# Patient Record
Sex: FEMALE | Race: BLACK OR AFRICAN AMERICAN | Employment: OTHER | URBAN - METROPOLITAN AREA
[De-identification: names, ages, dates, MRNs, and addresses within clinical notes are randomized per-mention and may not be internally consistent; named-entity substitution may affect disease eponyms.]

---

## 2016-09-26 LAB — HBA1C MFR BLD HPLC: 13.5 %

## 2017-08-27 DIAGNOSIS — E78.00 PURE HYPERCHOLESTEROLEMIA: ICD-10-CM

## 2017-08-28 RX ORDER — ATORVASTATIN CALCIUM 20 MG/1
TABLET, FILM COATED ORAL
Qty: 30 TAB | Refills: 0 | Status: SHIPPED | OUTPATIENT
Start: 2017-08-28 | End: 2017-09-19 | Stop reason: SDUPTHER

## 2017-09-19 ENCOUNTER — OFFICE VISIT (OUTPATIENT)
Dept: FAMILY MEDICINE CLINIC | Age: 62
End: 2017-09-19

## 2017-09-19 VITALS
DIASTOLIC BLOOD PRESSURE: 89 MMHG | WEIGHT: 185.4 LBS | SYSTOLIC BLOOD PRESSURE: 144 MMHG | TEMPERATURE: 97.3 F | HEART RATE: 87 BPM | BODY MASS INDEX: 32.85 KG/M2 | OXYGEN SATURATION: 95 % | RESPIRATION RATE: 16 BRPM | HEIGHT: 63 IN

## 2017-09-19 DIAGNOSIS — Z23 ENCOUNTER FOR IMMUNIZATION: ICD-10-CM

## 2017-09-19 DIAGNOSIS — L01.00 IMPETIGO: ICD-10-CM

## 2017-09-19 DIAGNOSIS — I10 ESSENTIAL HYPERTENSION: Primary | ICD-10-CM

## 2017-09-19 DIAGNOSIS — E78.00 PURE HYPERCHOLESTEROLEMIA: ICD-10-CM

## 2017-09-19 DIAGNOSIS — E11.9 CONTROLLED TYPE 2 DIABETES MELLITUS WITHOUT COMPLICATION, WITHOUT LONG-TERM CURRENT USE OF INSULIN (HCC): ICD-10-CM

## 2017-09-19 DIAGNOSIS — E55.9 VITAMIN D DEFICIENCY: ICD-10-CM

## 2017-09-19 DIAGNOSIS — Z12.11 SCREENING FOR COLON CANCER: ICD-10-CM

## 2017-09-19 LAB — HBA1C MFR BLD HPLC: 6.6 %

## 2017-09-19 RX ORDER — ATORVASTATIN CALCIUM 20 MG/1
TABLET, FILM COATED ORAL
Qty: 90 TAB | Refills: 1 | Status: SHIPPED | OUTPATIENT
Start: 2017-09-19

## 2017-09-19 RX ORDER — GLIMEPIRIDE 4 MG/1
TABLET ORAL
Refills: 3 | COMMUNITY
Start: 2017-07-24

## 2017-09-19 RX ORDER — EMPAGLIFLOZIN 25 MG/1
TABLET, FILM COATED ORAL
Refills: 3 | COMMUNITY
Start: 2017-08-04

## 2017-09-19 RX ORDER — DULAGLUTIDE 1.5 MG/.5ML
INJECTION, SOLUTION SUBCUTANEOUS
Refills: 5 | COMMUNITY
Start: 2017-07-24

## 2017-09-19 RX ORDER — LOSARTAN POTASSIUM AND HYDROCHLOROTHIAZIDE 12.5; 5 MG/1; MG/1
TABLET ORAL
Refills: 4 | COMMUNITY
Start: 2017-08-04 | End: 2017-09-19 | Stop reason: SDUPTHER

## 2017-09-19 RX ORDER — LOSARTAN POTASSIUM AND HYDROCHLOROTHIAZIDE 12.5; 5 MG/1; MG/1
TABLET ORAL
Qty: 90 TAB | Refills: 1 | Status: SHIPPED | OUTPATIENT
Start: 2017-09-19

## 2017-09-19 NOTE — PROGRESS NOTES
Chief Complaint   Patient presents with    Medication Refill    Skin Problem     Has area on right wrist and she used Neosporin and it went away but it came back about 1 week ago and is larger. The area itches.  Labs     Fasting     1. Have you been to the ER, urgent care clinic since your last visit? Hospitalized since your last visit? No    2. Have you seen or consulted any other health care providers outside of the [address] [place] [address] since your last visit? Include any pap smears or colon screening. No     I have reviewed Health Maintenance with the patient and updated. Advance Care Planning information reviewed and given to the patient. The patient was counseled on the dangers of tobacco use, and Patient is a non smoker. Reviewed strategies to maximize success, including Continue not to smoke.

## 2017-09-19 NOTE — ASSESSMENT & PLAN NOTE
This condition is managed by Specialist.  Key Antihyperglycemic Medications             TRULICITY 1.5 NV/5.1 mL sub-q pen  (Taking) INJECT 1.5 MG ONCE A WEEK SUBCUTANEOUSLY    JARDIANCE 25 mg tablet  (Taking) TAKE 1 TABLET BY MOUTH EVERY MORNING    glimepiride (AMARYL) 4 mg tablet  (Taking) 2 TAB(S) ONCE A DAY ORALLY    metformin ER (GLUCOPHAGE XR) 500 mg tablet  (Taking) Take 1,000 mg by mouth daily (with dinner).         Other Key Diabetic Medications             losartan-hydroCHLOROthiazide (HYZAAR) 50-12.5 mg per tablet  (Taking) TAKE 1 TABLET BY MOUTH EVERY DAY    atorvastatin (LIPITOR) 20 mg tablet  (Taking) TAKE 1 TABLET EVERY DAY    fenofibrate micronized (LOFIBRA) 134 mg capsule  (Taking) TAKE ONE CAPSULE BY MOUTH DAILY        Lab Results   Component Value Date/Time    Hemoglobin A1c, External 13.5 09/26/2016    Glucose 310 08/17/2016 09:31 AM    Creatinine 0.69 08/17/2016 09:31 AM    Creatinine, External 0.85 06/21/2016    Microalb/Creat ratio (ug/mg creat.) 5.0 08/17/2016 10:40 AM    Urine Microalbumin, External 10 06/21/2016    Cholesterol, total 195 08/17/2016 09:31 AM    HDL Cholesterol 50 08/17/2016 09:31 AM    LDL, calculated 106 08/17/2016 09:31 AM    LDL-C, External 137 06/21/2016    Triglyceride 193 08/17/2016 09:31 AM     Diabetic Foot and Eye Exam HM Status   Topic Date Due    Diabetic Foot Care  12/18/2017 (Originally 12/2/2013)   [facility] Exam  12/18/2017 (Originally 7/18/1965)

## 2017-09-19 NOTE — MR AVS SNAPSHOT
Visit Information Date & Time Provider Department Dept. Phone Encounter #  
 9/19/2017 11:20 AM Vasyl Scott MD [facility] [phone] [id number] Follow-up Instructions Return in about 6 months (around 3/19/2018) for Recheck. Upcoming Health Maintenance Date Due INFLUENZA AGE 9 TO ADULT 8/1/2017 MICROALBUMIN Q1 8/17/2017 LIPID PANEL Q1 8/17/2017 ZOSTER VACCINE AGE 60> 12/18/2017* FOOT EXAM Q1 12/18/2017* BREAST CANCER SCRN MAMMOGRAM 12/18/2017* COLONOSCOPY 12/18/2017* PAP AKA CERVICAL CYTOLOGY 12/18/2017* EYE EXAM RETINAL OR DILATED Q1 12/18/2017* HEMOGLOBIN A1C Q6M 3/19/2018 DTaP/Tdap/Td series (2 - Td) 1/12/2021 *Topic was postponed. The date shown is not the original due date. Allergies as of 9/19/2017  Review Complete On: 9/19/2017 By: Tammy Villa RN Severity Noted Reaction Type Reactions Lisinopril  01/13/2010   Intolerance Cough Pcn [Penicillins]  08/05/2009    Other (comments) From childhood, couldn't walk, limp Current Immunizations  Reviewed on 10/21/2013 Name Date Influenza Vaccine 12/22/2014, 10/14/2013 Influenza Vaccine (Quad) 3/3/2016  4:50 PM  
 Influenza Vaccine Split 10/1/2010 TDAP Vaccine 1/12/2011 ZZZ-RETIRED (DO NOT USE) Pneumococcal Vaccine (Unspecified Type) 8/5/1997 Not reviewed this visit You Were Diagnosed With   
  
 Codes Comments Essential hypertension    -  Primary ICD-10-CM: I10 
ICD-9-CM: 401.9 Pure hypercholesterolemia     ICD-10-CM: E78.00 ICD-9-CM: 272.0 Impetigo     ICD-10-CM: L01.00 ICD-9-CM: 560 Screening for colon cancer     ICD-10-CM: Z12.11 ICD-9-CM: V76.51 Controlled type 2 diabetes mellitus without complication, without long-term current use of insulin ([facility]     ICD-10-CM: E11.9 ICD-9-CM: 250.00 Vitamin D deficiency     ICD-10-CM: E55.9 ICD-9-CM: 268.9 Vitals BP Pulse Temp Resp Height(growth percentile) Weight(growth percentile) 144/89 (BP 1 Location: Left arm, BP Patient Position: Sitting) 87 97.3 °F (36.3 °C) (Oral) 16 5' 3\" (1.6 m) 185 lb 6.4 oz (84.1 kg) SpO2 BMI OB Status Smoking Status 95% 32.84 kg/m2 Postmenopausal Never Smoker Vitals History BMI and BSA Data Body Mass Index Body Surface Area  
 32.84 kg/m 2 1.93 m 2 Preferred Pharmacy Pharmacy Name Phone [id number], [address] [place] [address] [phone] Your Updated Medication List  
  
   
This list is accurate as of: 9/19/17 11:51 AM.  Always use your most recent med list.  
  
  
  
  
 [facility] Generic drug:  Lancets  
  
 atorvastatin 20 mg tablet Commonly known as:  LIPITOR  
TAKE 1 TABLET EVERY DAY  
  
 cholecalciferol (vitamin D3) 2,000 unit Tab Take 4,000 Units by mouth daily. fenofibrate micronized 134 mg capsule Commonly known as:  LOFIBRA TAKE ONE CAPSULE BY MOUTH DAILY  
  
 glimepiride 4 mg tablet Commonly known as:  AMARYL  
2 TAB(S) ONCE A DAY ORALLY  
  
 haloperidol 0.5 mg tablet Commonly known as:  HALDOL  
0.5 mg nightly. JARDIANCE 25 mg tablet Generic drug:  empagliflozin TAKE 1 TABLET BY MOUTH EVERY MORNING  
  
 losartan-hydroCHLOROthiazide 50-12.5 mg per tablet Commonly known as:  HYZAAR  
TAKE 1 TABLET BY MOUTH EVERY DAY  
  
 metFORMIN  mg tablet Commonly known as:  GLUCOPHAGE XR Take 1,000 mg by mouth daily (with dinner). TRULICITY 1.5 [place]/5.4 mL sub-q pen Generic drug:  dulaglutide INJECT 1.5 MG ONCE A WEEK SUBCUTANEOUSLY  
  
 vitamin e 1,000 unit capsule Commonly known as:  E GEMS Take 1,000 Units by mouth daily. Prescriptions Sent to Pharmacy Refills  
 losartan-hydroCHLOROthiazide (HYZAAR) 50-12.5 mg per tablet 1  Sig: TAKE 1 TABLET BY MOUTH EVERY DAY  
 Class: Normal  
 Pharmacy: [facility] #[id number] ALEXIA [address] [place] [address] Ph #: [phone]  
 atorvastatin (LIPITOR) 20 mg tablet 1 Sig: TAKE 1 TABLET EVERY DAY Class: Normal  
 Pharmacy: [facility]/pharmacy #[id number] ALEXIA [address] [place] [address] Ph #: [phone] We Performed the Following AMB POC HEMOGLOBIN A1C [26927 CPT(R)] CBC WITH AUTOMATED DIFF [40015 CPT(R)] LIPID PANEL [80122 CPT(R)] METABOLIC PANEL, COMPREHENSIVE [51243 CPT(R)] MICROALBUMIN, UR, RAND W/ MICROALBUMIN/CREA RATIO [id number] CPT(R)] OCCULT BLOOD, IMMUNOASSAY (FIT) X721325 CPT(R)] TSH 3RD GENERATION [72238 CPT(R)] URINALYSIS W/ RFLX MICROSCOPIC [32160 CPT(R)] VITAMIN D, 25 HYDROXY [id number] CPT(R)] Follow-up Instructions Return in about 6 months (around 3/19/2018) for Recheck. Introducing [facility] & HE[facility]! Jaquan Sood introduces flaregames patient portal. Now you can access parts of your medical record, email your doctor's office, and request medication refills online. 1. In your internet browser, go to [email or web address] 2. Click on the First Time User? Click Here link in the Sign In box. You will see the New Member Sign Up page. 3. Enter your flaregames Access Code exactly as it appears below. You will not need to use this code after youve completed the sign-up process. If you do not sign up before the expiration date, you must request a new code. · flaregames Access Code: [email or web address] Expires: 12/18/2017 11:47 AM 
 
4. Enter the last four digits of your Social Security Number (xxxx) and Date of Birth (mm/dd/yyyy) as indicated and click Submit. You will be taken to the next sign-up page. 5. Create a flaregames ID. This will be your flaregames login ID and cannot be changed, so think of one that is secure and easy to remember. 6. Create a [employer] password. You can change your password at any time. 7. Enter your Password Reset Question and Answer. This can be used at a later time if you forget your password. 8. Enter your e-mail address. You will receive e-mail notification when new information is available in [address] 9. Click Sign Up. You can now view and download portions of your medical record. 10. Click the Download Summary menu link to download a portable copy of your medical information. If you have questions, please visit the Frequently Asked Questions section of the [employer] website. Remember, [employer] is NOT to be used for urgent needs. For medical emergencies, dial 911. Now available from your iPhone and Android! Please provide this summary of care documentation to your next provider. Your primary care clinician is listed as [id number] If you have any questions after today's visit, please call [phone].

## 2017-09-19 NOTE — PROGRESS NOTES
On meds per endo. Had flu shot. Needs annual labs. Sees endo 2 x annually. Sees in Nov.  Place on distal forearm resolved with topical antibiotic but came back. Visit Vitals    /89 (BP 1 Location: Left arm, BP Patient Position: Sitting)    Pulse 87    Temp 97.3 °F (36.3 °C) (Oral)    Resp 16    Ht 5' 3\" (1.6 m)    Wt 185 lb 6.4 oz (84.1 kg)    SpO2 95%    BMI 32.84 kg/m2       Patient alert and cooperative. Reviewed above. Assessment:  1. AODM, much improved control on the new med regimen. 2. Right forearm with what appears to be about a 1 cm diameter area of impetigo. Plan:  1. Advised to restart the topical antibiotic twice a day. 2. Keep covered. 3. If not resolved in a week follow up for further evaluation. 4. Overdue fasting annual labs ordered. 5. Refilled blood pressure and cholesterol medicine. 6. Recheck in six months, otherwise prn.

## 2017-09-19 NOTE — LETTER
10/3/2017 3:46 PM 
 
Ms. Kolby Lewis [address] [facility] [address] [address] Dear Kolby Lewis: 
 
Please find your most recent results below. Resulted Orders AMB POC HEMOGLOBIN A1C Result Value Ref Range Hemoglobin A1c (POC) 6.6 % VITAMIN D, 25 HYDROXY Result Value Ref Range VITAMIN D, 25-HYDROXY 34.1 30.0 - 100.0 ng/mL Comment:  
   Vitamin D deficiency has been defined by the [facility] practice guideline as a 
level of serum 25-OH vitamin D less than 20 ng/mL (1,2). The Endocrine Society went on to further define vitamin D 
insufficiency as a level between 21 and 29 ng/mL (2). 1. IOM ([place] of Medicine). 2010. Dietary reference 
   intakes for calcium and D. [facility]: The 
   [employer]. 2. Lynsey MF, Cielo NC, Sujata LIMON, et al. 
   Evaluation, treatment, and prevention of vitamin D 
   deficiency: an Endocrine Society clinical practice 
   guideline. JCEM. 2011 Jul; 96(7):1911-30. Narrative Performed at:  [address]  [id number] : Nito William MD, Phone:  [phone] MICROALBUMIN, UR, RAND W/ MICROALBUMIN/CREA RATIO Result Value Ref Range Creatinine, urine 63.1 Not Estab. mg/dL Microalbumin, urine <3.0 Not Estab. ug/mL Microalb/Creat ratio (ug/mg creat.) <4.8 0.0 - 30.0 mg/g creat Narrative Performed at:  [address]  [id number] : Nito William MD, Phone:  [phone] CBC WITH AUTOMATED DIFF Result Value Ref Range WBC 6.1 3.4 - 10.8 x10E3/uL  
 RBC 5.22 3.77 - 5.28 x10E6/uL HGB 14.5 11.1 - 15.9 g/dL HCT 43.1 34.0 - 46.6 % MCV 83 79 - 97 fL  
 MCH 27.8 26.6 - 33.0 pg  
 MCHC 33.6 31.5 - 35.7 g/dL  
 RDW 15.0 12.3 - 15.4 % PLATELET 531 437 - 310 x10E3/uL NEUTROPHILS 43 % Lymphocytes 45 % MONOCYTES 10 % EOSINOPHILS 2 % BASOPHILS 0 %  
 ABS. NEUTROPHILS 2.6 1.4 - 7.0 x10E3/uL Abs Lymphocytes 2.7 0.7 - 3.1 x10E3/uL  
 ABS. MONOCYTES 0.6 0.1 - 0.9 x10E3/uL  
 ABS. EOSINOPHILS 0.1 0.0 - 0.4 x10E3/uL  
 ABS. BASOPHILS 0.0 0.0 - 0.2 x10E3/uL IMMATURE GRANULOCYTES 0 %  
 ABS. IMM. GRANS. 0.0 0.0 - 0.1 x10E3/uL Narrative Performed at:  [address]  [id number] : Deyanira Ward MD, Phone:  [phone] URINALYSIS W/ RFLX MICROSCOPIC Result Value Ref Range Specific Gravity 1.029 1.005 - 1.030  
 pH (UA) 5.0 5.0 - 7.5 Color Yellow Yellow Appearance Cloudy (A) Clear Leukocyte Esterase Negative Negative Protein Negative Negative/Trace Glucose 3+ (A) Negative Ketone Negative Negative Blood Negative Negative Bilirubin Negative Negative Urobilinogen 0.2 0.2 - 1.0 mg/dL Nitrites Positive (A) Negative Microscopic Examination See additional order Comment:  
   Microscopic was indicated and was performed. Narrative Performed at:  [address]  [id number] : Deyanira Ward MD, Phone:  [phone] TSH 3RD GENERATION Result Value Ref Range TSH 1.690 0.450 - 4.500 uIU/mL Narrative Performed at:  [address]  [id number] : Deyanira Ward MD, Phone:  [phone] METABOLIC PANEL, COMPREHENSIVE Result Value Ref Range Glucose 116 (H) 65 - 99 mg/dL BUN 15 8 - 27 mg/dL Creatinine 0.62 0.57 - 1.00 mg/dL GFR est non-AA 98 >59 mL/min/1.73 GFR est  >59 mL/min/1.73  
 BUN/Creatinine ratio 24 12 - 28 Sodium 140 134 - 144 mmol/L Potassium 4.1 3.5 - 5.2 mmol/L Chloride 99 96 - 106 mmol/L  
 CO2 21 18 - 29 mmol/L Calcium 10.3 8.7 - 10.3 mg/dL Protein, total 7.9 6.0 - 8.5 g/dL Albumin 4.8 3.6 - 4.8 g/dL GLOBULIN, TOTAL 3.1 1.5 - 4.5 g/dL A-G Ratio 1.5 1.2 - 2.2 Bilirubin, total 0.4 0.0 - 1.2 mg/dL Alk. phosphatase 78 39 - 117 IU/L  
 AST (SGOT) 16 0 - 40 IU/L  
 ALT (SGPT) 17 0 - 32 IU/L Narrative Performed at:  [address]  [id number] : Silver King MD, Phone:  [phone] LIPID PANEL Result Value Ref Range Cholesterol, total 211 (H) 100 - 199 mg/dL Triglyceride 179 (H) 0 - 149 mg/dL HDL Cholesterol 62 >39 mg/dL VLDL, calculated 36 5 - 40 mg/dL LDL, calculated 113 (H) 0 - 99 mg/dL Narrative Performed at:  [address]  [id number] : Silver King MD, Phone:  [phone] MICROSCOPIC EXAMINATION Result Value Ref Range WBC 0-5 0 - 5 /hpf  
 RBC 0-2 0 - 2 /hpf Epithelial cells 0-10 0 - 10 /hpf Casts None seen None seen /lpf Mucus Present Not Estab. Bacteria Few None seen/Few Narrative Performed at:  [address]  [id number] : Silver King MD, Phone:  [phone] CVD REPORT Result Value Ref Range INTERPRETATION Note Comment:  
   Supplement report is available. PDF IMAGE Not applicable Narrative Performed at:  [address] [place] [address]  [id number] : Neftali Chapman PhD, Phone:  [phone] DIABETES PATIENT EDUCATION Result Value Ref Range PDF Image Not applicable Narrative Performed at:  [address] [place] [address]  [id number] : Neftali Chapman PhD, Phone:  [phone] RECOMMENDATIONS: 
Expected glucosuria. Triglycerides have improved.  Continue on atorvastatin. The rest of your labs are OK. Please call me if you have any questions: [phone] Sincerely, Dion Moctezuma MD

## 2017-09-21 LAB
25(OH)D3+25(OH)D2 SERPL-MCNC: 34.1 NG/ML (ref 30–100)
ALBUMIN SERPL-MCNC: 4.8 G/DL (ref 3.6–4.8)
ALBUMIN/CREAT UR: <4.8 MG/G CREAT (ref 0–30)
ALBUMIN/GLOB SERPL: 1.5 {RATIO} (ref 1.2–2.2)
ALP SERPL-CCNC: 78 IU/L (ref 39–117)
ALT SERPL-CCNC: 17 IU/L (ref 0–32)
APPEARANCE UR: ABNORMAL
AST SERPL-CCNC: 16 IU/L (ref 0–40)
BACTERIA #/AREA URNS HPF: NORMAL /[HPF]
BASOPHILS # BLD AUTO: 0 X10E3/UL (ref 0–0.2)
BASOPHILS NFR BLD AUTO: 0 %
BILIRUB SERPL-MCNC: 0.4 MG/DL (ref 0–1.2)
BILIRUB UR QL STRIP: NEGATIVE
BUN SERPL-MCNC: 15 MG/DL (ref 8–27)
BUN/CREAT SERPL: 24 (ref 12–28)
CALCIUM SERPL-MCNC: 10.3 MG/DL (ref 8.7–10.3)
CASTS URNS QL MICRO: NORMAL /LPF
CHLORIDE SERPL-SCNC: 99 MMOL/L (ref 96–106)
CHOLEST SERPL-MCNC: 211 MG/DL (ref 100–199)
CO2 SERPL-SCNC: 21 MMOL/L (ref 18–29)
COLOR UR: YELLOW
CREAT SERPL-MCNC: 0.62 MG/DL (ref 0.57–1)
CREAT UR-MCNC: 63.1 MG/DL
EOSINOPHIL # BLD AUTO: 0.1 X10E3/UL (ref 0–0.4)
EOSINOPHIL NFR BLD AUTO: 2 %
EPI CELLS #/AREA URNS HPF: NORMAL /HPF
ERYTHROCYTE [DISTWIDTH] IN BLOOD BY AUTOMATED COUNT: 15 % (ref 12.3–15.4)
GLOBULIN SER CALC-MCNC: 3.1 G/DL (ref 1.5–4.5)
GLUCOSE SERPL-MCNC: 116 MG/DL (ref 65–99)
GLUCOSE UR QL: ABNORMAL
HCT VFR BLD AUTO: 43.1 % (ref 34–46.6)
HDLC SERPL-MCNC: 62 MG/DL
HGB BLD-MCNC: 14.5 G/DL (ref 11.1–15.9)
HGB UR QL STRIP: NEGATIVE
IMM GRANULOCYTES # BLD: 0 X10E3/UL (ref 0–0.1)
IMM GRANULOCYTES NFR BLD: 0 %
INTERPRETATION, 910389: NORMAL
KETONES UR QL STRIP: NEGATIVE
LDLC SERPL CALC-MCNC: 113 MG/DL (ref 0–99)
LEUKOCYTE ESTERASE UR QL STRIP: NEGATIVE
LYMPHOCYTES # BLD AUTO: 2.7 X10E3/UL (ref 0.7–3.1)
LYMPHOCYTES NFR BLD AUTO: 45 %
Lab: NORMAL
MCH RBC QN AUTO: 27.8 PG (ref 26.6–33)
MCHC RBC AUTO-ENTMCNC: 33.6 G/DL (ref 31.5–35.7)
MCV RBC AUTO: 83 FL (ref 79–97)
MICRO URNS: ABNORMAL
MICROALBUMIN UR-MCNC: <3 UG/ML
MONOCYTES # BLD AUTO: 0.6 X10E3/UL (ref 0.1–0.9)
MONOCYTES NFR BLD AUTO: 10 %
MUCOUS THREADS URNS QL MICRO: PRESENT
NEUTROPHILS # BLD AUTO: 2.6 X10E3/UL (ref 1.4–7)
NEUTROPHILS NFR BLD AUTO: 43 %
NITRITE UR QL STRIP: POSITIVE
PDF IMAGE, 910387: NORMAL
PH UR STRIP: 5 [PH] (ref 5–7.5)
PLATELET # BLD AUTO: 328 X10E3/UL (ref 150–379)
POTASSIUM SERPL-SCNC: 4.1 MMOL/L (ref 3.5–5.2)
PROT SERPL-MCNC: 7.9 G/DL (ref 6–8.5)
PROT UR QL STRIP: NEGATIVE
RBC # BLD AUTO: 5.22 X10E6/UL (ref 3.77–5.28)
RBC #/AREA URNS HPF: NORMAL /HPF
SODIUM SERPL-SCNC: 140 MMOL/L (ref 134–144)
SP GR UR: 1.03 (ref 1–1.03)
TRIGL SERPL-MCNC: 179 MG/DL (ref 0–149)
TSH SERPL DL<=0.005 MIU/L-ACNC: 1.69 UIU/ML (ref 0.45–4.5)
UROBILINOGEN UR STRIP-MCNC: 0.2 MG/DL (ref 0.2–1)
VLDLC SERPL CALC-MCNC: 36 MG/DL (ref 5–40)
WBC # BLD AUTO: 6.1 X10E3/UL (ref 3.4–10.8)
WBC #/AREA URNS HPF: NORMAL /HPF

## 2017-09-25 ENCOUNTER — OFFICE VISIT (OUTPATIENT)
Dept: FAMILY MEDICINE CLINIC | Age: 62
End: 2017-09-25

## 2017-09-25 VITALS
BODY MASS INDEX: 33.49 KG/M2 | TEMPERATURE: 98 F | SYSTOLIC BLOOD PRESSURE: 139 MMHG | HEART RATE: 90 BPM | HEIGHT: 63 IN | RESPIRATION RATE: 18 BRPM | DIASTOLIC BLOOD PRESSURE: 78 MMHG | OXYGEN SATURATION: 95 % | WEIGHT: 189 LBS

## 2017-09-25 DIAGNOSIS — R23.8 SKIN IRRITATION: ICD-10-CM

## 2017-09-25 DIAGNOSIS — L01.00 IMPETIGO: Primary | ICD-10-CM

## 2017-09-25 RX ORDER — MUPIROCIN CALCIUM 20 MG/G
CREAM TOPICAL 2 TIMES DAILY
Qty: 15 G | Refills: 0 | Status: SHIPPED | OUTPATIENT
Start: 2017-09-25 | End: 2017-10-04 | Stop reason: ALTCHOICE

## 2017-09-25 NOTE — MR AVS SNAPSHOT
Visit Information Date & Time Provider Department Dept. Phone Encounter #  
 9/25/2017  5:00 PM Arik Crespo NP [facility] [phone] [id number] Upcoming Health Maintenance Date Due ZOSTER VACCINE AGE 60> 12/18/2017* FOOT EXAM Q1 12/18/2017* BREAST CANCER SCRN MAMMOGRAM 12/18/2017* COLONOSCOPY 12/18/2017* PAP AKA CERVICAL CYTOLOGY 12/18/2017* EYE EXAM RETINAL OR DILATED Q1 12/18/2017* HEMOGLOBIN A1C Q6M 3/19/2018 MICROALBUMIN Q1 9/19/2018 LIPID PANEL Q1 9/19/2018 DTaP/Tdap/Td series (2 - Td) 1/12/2021 *Topic was postponed. The date shown is not the original due date. Allergies as of 9/25/2017  Review Complete On: 9/25/2017 By: Charles Rivera LPN Severity Noted Reaction Type Reactions Lisinopril  01/13/2010   Intolerance Cough Pcn [Penicillins]  08/05/2009    Other (comments) From childhood, couldn't walk, limp Current Immunizations  Reviewed on 9/19/2017 Name Date Influenza Vaccine 12/22/2014, 10/14/2013 Influenza Vaccine (Quad) 3/3/2016  4:50 PM  
 Influenza Vaccine (Quad) PF 9/19/2017 Influenza Vaccine Split 10/1/2010 Pneumococcal Polysaccharide (PPSV-23) 8/5/1997 TDAP Vaccine 1/12/2011 Not reviewed this visit You Were Diagnosed With   
  
 Codes Comments Impetigo    -  Primary ICD-10-CM: L01.00 ICD-9-CM: 962 Skin irritation     ICD-10-CM: R23.8 ICD-9-CM: 709.9 Vitals BP Pulse Temp Resp Height(growth percentile) Weight(growth percentile) 139/78 (BP 1 Location: Left arm, BP Patient Position: Sitting) 90 98 °F (36.7 °C) (Oral) 18 5' 3\" (1.6 m) 189 lb (85.7 kg) SpO2 BMI OB Status Smoking Status 95% 33.48 kg/m2 Postmenopausal Never Smoker Vitals History BMI and BSA Data Body Mass Index Body Surface Area  
 33.48 kg/m 2 1.95 m 2 Preferred Pharmacy Pharmacy Name Phone [facility] #[id number] [phone] Your Updated Medication List  
  
   
This list is accurate as of: 9/25/17  5:45 PM.  Always use your most recent med list.  
  
  
  
  
 [facility] Generic drug:  Lancets  
  
 atorvastatin 20 mg tablet Commonly known as:  LIPITOR  
TAKE 1 TABLET EVERY DAY  
  
 cholecalciferol (vitamin D3) 2,000 unit Tab Take 4,000 Units by mouth daily. fenofibrate micronized 134 mg capsule Commonly known as:  LOFIBRA TAKE ONE CAPSULE BY MOUTH DAILY  
  
 glimepiride 4 mg tablet Commonly known as:  AMARYL  
2 TAB(S) ONCE A DAY ORALLY  
  
 haloperidol 0.5 mg tablet Commonly known as:  HALDOL  
0.5 mg nightly. JARDIANCE 25 mg tablet Generic drug:  empagliflozin TAKE 1 TABLET BY MOUTH EVERY MORNING  
  
 losartan-hydroCHLOROthiazide 50-12.5 mg per tablet Commonly known as:  HYZAAR  
TAKE 1 TABLET BY MOUTH EVERY DAY  
  
 metFORMIN  mg tablet Commonly known as:  GLUCOPHAGE XR Take 1,000 mg by mouth daily (with dinner). mupirocin calcium 2 % topical cream  
Commonly known as:  [facility] [employer] Apply  to affected area two (2) times a day. TRULICITY 1.5 OV/3.3 mL sub-q pen Generic drug:  dulaglutide INJECT 1.5 MG ONCE A WEEK SUBCUTANEOUSLY  
  
 vitamin e 1,000 unit capsule Commonly known as:  E GEMS Take 1,000 Units by mouth daily. Prescriptions Sent to Pharmacy Refills  
 mupirocin calcium (BACTROBAN) 2 % topical cream 0 Sig: Apply  to affected area two (2) times a day. Class: Normal  
 Pharmacy: [facility]/pharmacy #[id number] Ph #: [phone] Route: Topical  
  
Patient Instructions 1) Impetigo - put mupirocin cream on 2x day Impetigo: Care Instructions Your Care Instructions Impetigo (say \"ps-zlj-AZ-go\") is a skin infection caused by bacteria. It causes blisters that break and become oozing, yellow, crusty sores. Impetigo can be anywhere on the body. Scratching the sores may spread the infection to other parts of the body. You can also spread it to others through close contact or when you share towels, clothing, and other items. Prescription antibiotic ointment or pills can usually cure impetigo. (After a day of antibiotics, the infection should not spread.) Follow-up care is a key part of your treatment and safety. Be sure to make and go to all appointments, and call your doctor if you are having problems. It's also a good idea to know your test results and keep a list of the medicines you take. How can you care for yourself at home? · Apply antibiotic ointment exactly as instructed. · If your doctor prescribed antibiotic pills, take them as directed. Do not stop using them just because you feel better. You need to take the full course of antibiotics. · Gently wash the sores with soap and water each day. If crusts form, your doctor may advise you to soften or remove the crusts. You can do this by soaking them in warm water and patting them dry. This can help the cream or ointment treat impetigo. · After you touch the area, wash your hands with soap and water. Or you can use an alcohol-based hand . · Don't share items such as towels, sheets, and clothing until the infection is gone. · Wash anything that may have touched the infected area. · Try to avoid scratching the area. When should you call for help? Call your doctor now or seek immediate medical care if: 
· You have symptoms of a worse infection, such as: 
¨ Increased pain, swelling, warmth, or redness. ¨ Red streaks leading from the area. ¨ Pus draining from the area. ¨ A fever. · Impetigo gets worse or spreads to other areas.  
Watch closely for changes in your health, and be sure to contact your doctor if: 
· You do not get better as expected. Where can you learn more? Go to http://yonas-rasta.info/. Enter U491 in the search box to learn more about \"Impetigo: Care Instructions. \" Current as of: July 26, 2016 Content Version: 11.3 © 6986-5750 [employer]. Care instructions adapted under license by [employer] (which disclaims liability or warranty for this information). If you have questions about a medical condition or this instruction, always ask your healthcare professional. [address] any warranty or liability for your use of this information. Introducing [facility] & HEALTH SERVICES! Madeline Barrow introduces Snapchat patient portal. Now you can access parts of your medical record, email your doctor's office, and request medication refills online. 1. In your internet browser, go to [email or web address] 2. Click on the First Time User? Click Here link in the Sign In box. You will see the New Member Sign Up page. 3. Enter your Snapchat Access Code exactly as it appears below. You will not need to use this code after youve completed the sign-up process. If you do not sign up before the expiration date, you must request a new code. · Snapchat Access Code: [email or web address] Expires: 12/18/2017 11:47 AM 
 
4. Enter the last four digits of your Social Security Number (xxxx) and Date of Birth (mm/dd/yyyy) as indicated and click Submit. You will be taken to the next sign-up page. 5. Create a Qompiumt ID. This will be your Snapchat login ID and cannot be changed, so think of one that is secure and easy to remember. 6. Create a Snapchat password. You can change your password at any time. 7. Enter your Password Reset Question and Answer. This can be used at a later time if you forget your password. 8. Enter your e-mail address. You will receive e-mail notification when new information is available in [address] 9. Click Sign Up. You can now view and download portions of your medical record. 10. Click the Download Summary menu link to download a portable copy of your medical information. If you have questions, please visit the Frequently Asked Questions section of the [employer] website. Remember, [employer] is NOT to be used for urgent needs. For medical emergencies, dial 911. Now available from your iPhone and Android! Please provide this summary of care documentation to your next provider. Your primary care clinician is listed as [id number] If you have any questions after today's visit, please call [phone].

## 2017-09-25 NOTE — PROGRESS NOTES
Jill Warner is a 63 y.o. female  Chief Complaint   Patient presents with    Rash     round reddish rash on right wrist that itches. It developed over a month ago     1. Have you been to the ER, urgent care clinic since your last visit? Hospitalized since your last visit? No    2. Have you seen or consulted any other health care providers outside of the [address] [place] [address] since your last visit? Include any pap smears or colon screening.  No

## 2017-09-25 NOTE — PATIENT INSTRUCTIONS
1) Impetigo - put mupirocin cream on 2x day         Impetigo: Care Instructions  Your Care Instructions  Impetigo (say \"ua-lcf-[id number]\") is a skin infection caused by bacteria. It causes blisters that break and become oozing, yellow, crusty sores. Impetigo can be anywhere on the body. Scratching the sores may spread the infection to other parts of the body. You can also spread it to others through close contact or when you share towels, clothing, and other items. Prescription antibiotic ointment or pills can usually cure impetigo. (After a day of antibiotics, the infection should not spread.)  Follow-up care is a key part of your treatment and safety. Be sure to make and go to all appointments, and call your doctor if you are having problems. It's also a good idea to know your test results and keep a list of the medicines you take. How can you care for yourself at home? · Apply antibiotic ointment exactly as instructed. · If your doctor prescribed antibiotic pills, take them as directed. Do not stop using them just because you feel better. You need to take the full course of antibiotics. · Gently wash the sores with soap and water each day. If crusts form, your doctor may advise you to soften or remove the crusts. You can do this by soaking them in warm water and patting them dry. This can help the cream or ointment treat impetigo. · After you touch the area, wash your hands with soap and water. Or you can use an alcohol-based hand . · Don't share items such as towels, sheets, and clothing until the infection is gone. · Wash anything that may have touched the infected area. · Try to avoid scratching the area. When should you call for help? Call your doctor now or seek immediate medical care if:  · You have symptoms of a worse infection, such as:  ¨ Increased pain, swelling, warmth, or redness. ¨ Red streaks leading from the area. ¨ Pus draining from the area. ¨ A fever.   · Impetigo gets worse or spreads to other areas. Watch closely for changes in your health, and be sure to contact your doctor if:  · You do not get better as expected. Where can you learn more? Go to http://yonas-rasta.info/. Enter P152 in the search box to learn more about \"Impetigo: Care Instructions. \"  Current as of: July 26, 2016  Content Version: 11.3  © 0607-6595 [employer]. Care instructions adapted under license by [employer] (which disclaims liability or warranty for this information). If you have questions about a medical condition or this instruction, always ask your healthcare professional. [address] any warranty or liability for your use of this information.

## 2017-09-25 NOTE — PROGRESS NOTES
S: Keysha Amaya is a 63 y.o. female who presents for skin/rash    Assessment/Plan:    1. Impetigo  -right wrist - 3cm x 2cm area, erythemateous w/small pustules  -mupirocin 2x day to affected area           HPI:  Working in yard, pulling up weeds    Onset: a month ago  Location: right wrist  Was using neosporin on area and covering, but made it itch more  Itching - yes,  Nno pain   Fever/chills - none  Drainage - none but did have some in beginning    Review of Systems:  - Constitutional Symptoms: no fatigue  - Cardiovascular: no chest pain or palpitations  - Respiratory: no cough or shortness of breath  - Musculoskeletal: no joint pains or weakness    I reviewed the following:  Past Medical History:   Diagnosis Date    Advance directive discussed with patient 03/03/2016    Allergic rhinitis     Allergic rhinitis     Ankle pain 03/03/2    PT FIRST note-calcification on XRay    Asthmatic bronchitis     Bronchitis     Cystitis with hematuria 6/6/16    PF [place]    [facility] ([facility]     [id number] blanco 5/5/17 note A1c of 10.1    GERD (gastroesophageal reflux disease)     Hypercholesteremia     Hypertension     Leukoplakia of cervix     Menopause     Psychotic disorder     brief episode of hearing voices past her dad's death    UTI        Current Outpatient Prescriptions   Medication Sig Dispense Refill    TRULICITY 1.5 QL/5.9 mL sub-q pen INJECT 1.5 MG ONCE A WEEK SUBCUTANEOUSLY  5    JARDIANCE 25 mg tablet TAKE 1 TABLET BY MOUTH EVERY MORNING  3    glimepiride (AMARYL) 4 mg tablet 2 TAB(S) ONCE A DAY ORALLY  3    losartan-hydroCHLOROthiazide (HYZAAR) 50-12.5 mg per tablet TAKE 1 TABLET BY MOUTH EVERY DAY 90 Tab 1    atorvastatin (LIPITOR) 20 mg tablet TAKE 1 TABLET EVERY DAY 90 Tab 1    fenofibrate micronized (LOFIBRA) 134 mg capsule TAKE ONE CAPSULE BY MOUTH DAILY 30 Cap 0    cholecalciferol, vitamin D3, 2,000 unit tab Take 4,000 Units by mouth daily.       vitamin e (E GEMS) 1,000 unit capsule Take 1,000 Units by mouth daily.  haloperidol (HALDOL) 0.5 mg tablet 0.5 mg nightly. 5    ACCU-CHEK MULTICLIX LANCET Misc       metformin ER (GLUCOPHAGE XR) 500 mg tablet Take 1,000 mg by mouth daily (with dinner). Allergies   Allergen Reactions    Lisinopril Cough    Pcn [Penicillins] Other (comments)     From childhood, couldn't walk, limp         O: VS:   Visit Vitals    /78 (BP 1 Location: Left arm, BP Patient Position: Sitting)    Pulse 90    Temp 98 °F (36.7 °C) (Oral)    Resp 18    Ht 5' 3\" (1.6 m)    Wt 189 lb (85.7 kg)    SpO2 95%    BMI 33.48 kg/m2       GENERAL: Ashvin Cordova is in no acute distress. Non-toxic. Well nourished. Well developed. Appropriately groomed. HEAD:  Normocephalic. Atraumatic. SKIN: Skin is warm and dry. Turgor is normal. No cyanosis. No jaundice or pallor. Primary lesion on right lateral wrist - 2cm x 3cm; patch with erythema and slight edema with 4 pustules scattered. Skin is slightly scaly. No other lesions evident. PSYCH: appropriate behavior, dress and thought processes. Good eye contact. Clear and coherent speech. Full affect. Good insight.   ______________________________________________________________________  Patient education was done. Advised on nutrition, tobacco, and alcohol. Counseling included discussion of diagnosis, differentials, treatment options, prescribed treatment, warning signs and follow up. Medication risks/benefits, interactions and alternatives discussed with patient.      Patient verbalized understanding and agreed to plan of care. Follow up in 1-2 weeks as needed or if symptoms progress.

## 2017-10-03 NOTE — PROGRESS NOTES
Phone attempts made to all numbers provided without success.   Letter printed and will be mailed to patient

## 2017-10-04 ENCOUNTER — OFFICE VISIT (OUTPATIENT)
Dept: FAMILY MEDICINE CLINIC | Age: 62
End: 2017-10-04

## 2017-10-04 VITALS
TEMPERATURE: 97.6 F | OXYGEN SATURATION: 93 % | RESPIRATION RATE: 93 BRPM | HEIGHT: 63 IN | BODY MASS INDEX: 33.31 KG/M2 | DIASTOLIC BLOOD PRESSURE: 87 MMHG | SYSTOLIC BLOOD PRESSURE: 151 MMHG | HEART RATE: 93 BPM | WEIGHT: 188 LBS

## 2017-10-04 DIAGNOSIS — B36.9 FUNGAL INFECTION OF SKIN: Primary | ICD-10-CM

## 2017-10-04 NOTE — MR AVS SNAPSHOT
Visit Information Date & Time Provider Department Dept. Phone Encounter #  
 10/4/2017  4:00 PM Samantha Murillo MD [facility] [phone] [id number] Follow-up Instructions Return if symptoms worsen or fail to improve. Upcoming Health Maintenance Date Due ZOSTER VACCINE AGE 60> 12/18/2017* FOOT EXAM Q1 12/18/2017* BREAST CANCER SCRN MAMMOGRAM 12/18/2017* COLONOSCOPY 12/18/2017* PAP AKA CERVICAL CYTOLOGY 12/18/2017* EYE EXAM RETINAL OR DILATED Q1 12/18/2017* HEMOGLOBIN A1C Q6M 3/19/2018 MICROALBUMIN Q1 9/19/2018 LIPID PANEL Q1 9/19/2018 DTaP/Tdap/Td series (2 - Td) 1/12/2021 *Topic was postponed. The date shown is not the original due date. Allergies as of 10/4/2017  Review Complete On: 10/4/2017 By: Daya Zhou RN Severity Noted Reaction Type Reactions Lisinopril  01/13/2010   Intolerance Cough Pcn [Penicillins]  08/05/2009    Other (comments) From childhood, couldn't walk, limp Current Immunizations  Reviewed on 9/19/2017 Name Date Influenza Vaccine 12/22/2014, 10/14/2013 Influenza Vaccine (Quad) 3/3/2016  4:50 PM  
 Influenza Vaccine (Quad) PF 9/19/2017 Influenza Vaccine Split 10/1/2010 Pneumococcal Polysaccharide (PPSV-23) 8/5/1997 TDAP Vaccine 1/12/2011 Not reviewed this visit Vitals BP Pulse Temp Resp Height(growth percentile) Weight(growth percentile) 151/87 (BP 1 Location: Right arm, BP Patient Position: Sitting) 93 97.6 °F (36.4 °C) (Oral) (!) 93 5' 3\" (1.6 m) 188 lb (85.3 kg) SpO2 BMI OB Status Smoking Status 93% 33.3 kg/m2 Postmenopausal Never Smoker Vitals History BMI and BSA Data Body Mass Index Body Surface Area  
 33.3 kg/m 2 1.95 m 2 Preferred Pharmacy Pharmacy Name Phone [id number]- [address] [phone] Your Updated Medication List  
  
   
This list is accurate as of: 10/4/17  4:15 PM.  Always use your most recent med list.  
  
  
  
  
 [facility] Generic drug:  Lancets  
  
 atorvastatin 20 mg tablet Commonly known as:  LIPITOR  
TAKE 1 TABLET EVERY DAY  
  
 cholecalciferol (vitamin D3) 2,000 unit Tab Take 4,000 Units by mouth daily. fenofibrate micronized 134 mg capsule Commonly known as:  LOFIBRA TAKE ONE CAPSULE BY MOUTH DAILY  
  
 glimepiride 4 mg tablet Commonly known as:  AMARYL  
2 TAB(S) ONCE A DAY ORALLY  
  
 haloperidol 0.5 mg tablet Commonly known as:  HALDOL  
0.5 mg nightly. JARDIANCE 25 mg tablet Generic drug:  empagliflozin TAKE 1 TABLET BY MOUTH EVERY MORNING  
  
 losartan-hydroCHLOROthiazide 50-12.5 mg per tablet Commonly known as:  HYZAAR  
TAKE 1 TABLET BY MOUTH EVERY DAY  
  
 metFORMIN  mg tablet Commonly known as:  GLUCOPHAGE XR Take 1,000 mg by mouth daily (with dinner). TRULICITY 1.5 XV/9.4 mL sub-q pen Generic drug:  dulaglutide INJECT 1.5 MG ONCE A WEEK SUBCUTANEOUSLY  
  
 vitamin e 1,000 unit capsule Commonly known as:  E GEMS Take 1,000 Units by mouth daily. Follow-up Instructions Return if symptoms worsen or fail to improve. Introducing [facility]! Kyler Christianson introduces [employer] patient portal. Now you can access parts of your medical record, email your doctor's office, and request medication refills online. 1. In your internet browser, go to [email or web address] 2. Click on the First Time User? Click Here link in the Sign In box. You will see the New Member Sign Up page. 3. Enter your [employer] Access Code exactly as it appears below. You will not need to use this code after youve completed the sign-up process. If you do not sign up before the expiration date, you must request a new code.  
 
· [employer] Access Code: [email or web address] 
 Expires: 12/18/2017 11:47 AM 
 
4. Enter the last four digits of your Social Security Number (xxxx) and Date of Birth (mm/dd/yyyy) as indicated and click Submit. You will be taken to the next sign-up page. 5. Create a [employer] ID. This will be your [employer] login ID and cannot be changed, so think of one that is secure and easy to remember. 6. Create a [employer] password. You can change your password at any time. 7. Enter your Password Reset Question and Answer. This can be used at a later time if you forget your password. 8. Enter your e-mail address. You will receive e-mail notification when new information is available in [address] 9. Click Sign Up. You can now view and download portions of your medical record. 10. Click the Download Summary menu link to download a portable copy of your medical information. If you have questions, please visit the Frequently Asked Questions section of the [employer] website. Remember, [employer] is NOT to be used for urgent needs. For medical emergencies, dial 911. Now available from your iPhone and Android! Please provide this summary of care documentation to your next provider. Your primary care clinician is listed as [id number] If you have any questions after today's visit, please call [phone].

## 2017-10-04 NOTE — PROGRESS NOTES
Chief Complaint   Patient presents with    Rash     Bactroban did not help given Clotrimazole Cream 1 % from the pharmacist which seems to be helping. 1. Have you been to the ER, urgent care clinic since your last visit? Hospitalized since your last visit? No    2. Have you seen or consulted any other health care providers outside of the [address] [place] [address] since your last visit? Include any pap smears or colon screening. No     I have reviewed Health Maintenance with the patient and updated. Advance Care Planning information reviewed and given to the patient at a previous visit. The patient was counseled on the dangers of tobacco use, and Patient is a non smoker. Reviewed strategies to maximize success, including Continue not to smoke.

## 2017-10-04 NOTE — PROGRESS NOTES
Tried Bactroban w/o benefit. Pharmacist gave Clotrimazole and has improved with 2 days of use. Visit Vitals    /87 (BP 1 Location: Right arm, BP Patient Position: Sitting)    Pulse 93    Temp 97.6 °F (36.4 °C) (Oral)    Resp (!) 93    Ht 5' 3\" (1.6 m)    Wt 188 lb (85.3 kg)    SpO2 93%    BMI 33.3 kg/m2       Patient alert and cooperative. Distal forearm on the right with circular, raised, bordered rash with some central clearing. Assessment:  1. Probable fungal infection, as has improved with Clotrimazole. Plan:  1. Continue b.i.d. Advised should clear up in a week or two.  2. Follow otherwise here prn.

## 2018-07-03 DIAGNOSIS — Z12.11 COLON CANCER SCREENING: Primary | ICD-10-CM

## 2018-12-24 ENCOUNTER — APPOINTMENT (OUTPATIENT)
Dept: CT IMAGING | Age: 63
End: 2018-12-24
Attending: PHYSICIAN ASSISTANT
Payer: COMMERCIAL

## 2018-12-24 ENCOUNTER — HOSPITAL ENCOUNTER (EMERGENCY)
Age: 63
Discharge: HOME | End: 2018-12-24
Attending: EMERGENCY MEDICINE
Payer: COMMERCIAL

## 2018-12-24 VITALS
OXYGEN SATURATION: 99 % | HEIGHT: 62 IN | TEMPERATURE: 97.7 F | WEIGHT: 185 LBS | SYSTOLIC BLOOD PRESSURE: 128 MMHG | BODY MASS INDEX: 34.04 KG/M2 | HEART RATE: 106 BPM | DIASTOLIC BLOOD PRESSURE: 84 MMHG | RESPIRATION RATE: 16 BRPM

## 2018-12-24 DIAGNOSIS — R51.9 ACUTE NONINTRACTABLE HEADACHE, UNSPECIFIED HEADACHE TYPE: ICD-10-CM
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
## 2018-12-24 PROCEDURE — 99283 EMERGENCY DEPT VISIT LOW MDM: CPT

## 2018-12-24 PROCEDURE — 70450 CT HEAD/BRAIN W/O DYE: CPT

## 2018-12-24 PROCEDURE — 74011250637 HC RX REV CODE- 250/637: Performed by: PHYSICIAN ASSISTANT

## 2018-12-24 RX ORDER — NAPROXEN 500 MG/1
500 TABLET ORAL 2 TIMES DAILY WITH MEALS
Qty: 20 TAB | Refills: 0 | Status: SHIPPED | OUTPATIENT
Start: 2018-12-24

## 2018-12-24 RX ORDER — METHOCARBAMOL 500 MG/1
500 TABLET, FILM COATED ORAL 4 TIMES DAILY
Qty: 30 TAB | Refills: 0 | Status: SHIPPED | OUTPATIENT
Start: 2018-12-24

## 2018-12-24 RX ORDER — NAPROXEN 250 MG/1
500 TABLET ORAL
Status: COMPLETED | OUTPATIENT
Start: 2018-12-24 | End: 2018-12-24

## 2018-12-24 RX ADMIN — NAPROXEN 500 MG: 250 TABLET ORAL at 20:31
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
## 2018-12-25 NOTE — ED NOTES
TAO Bell has reviewed discharge instructions with the patient. The patient verbalized understanding. Pt. A&Ox4, respirations even and unlabored. Declined wheelchair assist from department; paperwork in hand.